# Patient Record
(demographics unavailable — no encounter records)

---

## 2021-05-25 NOTE — OR
DATE OF PROCEDURE:  05/17/2021

 

SURGEON:  Junior Caldwell MD

 

PREOPERATIVE DIAGNOSIS:  Epigastric pain.

 

POSTOPERATIVE DIAGNOSES:

1. Epigastric pain associated with a small hiatal hernia and gastroesophageal reflux

    disease with possible Holt's esophagus.

2. Mild gastritis involving body and antrum of the stomach.

 

OPERATIVE PROCEDURES:  Esophagogastroduodenoscopy with:

1. Biopsy of esophagogastric junction for histologic evaluation.

2. Biopsies of antrum for CLOtest.

 

ANESTHESIA:  IV sedation.

 

INDICATION FOR PROCEDURE:  This is a 36-year-old male presenting with some ongoing

epigastric discomfort along with some heartburn symptoms.  Plan is to proceed with an upper

GI endoscopy.  The patient has never been on antisecretory medication, was given a

prescription for omeprazole 20 mg b.i.d., but has not started that yet, which is probably a

good thing in terms of getting baseline diagnostic evaluation.  The plans is to proceed with

upper endoscopy with biopsy as indicated.  Potential risks including bleeding and

perforation were discussed, and the patient wishes to proceed.

 

DESCRIPTION OF PROCEDURE:  The patient was taken to the operating room and placed in a left

lateral decubitus position.  IV sedation was administered, after which the upper GI

endoscope was passed orally through the length of the esophagus into the stomach,

retroflexion revealed the fundus with the pyloric channel into the proximal duodenum.

 

Findings included normal hypopharynx, larynx, upper esophageal sphincter, esophageal body.

At the EG junction, the patient was noted to have a small hernia measuring 1 to 2 cm.  There

was some generalized edema and friability of the distal esophagus, also there was some

upward extension of the columnar mucosa and a single island of columnar mucosa suggestive of

possible Holt's esophagus.  No stricturing or plaquing were seen.

 

We did a __________ distal body and antrum without erosions or ulcers.  Pyloric channel or

duodenum and any other __________ portions were otherwise unremarkable.  Biopsies were

obtained from the antrum and sent for CLOtest for H pylori.  Multiple biopsies were then

obtained from the esophagogastric junction focusing on the areas of possible Holt's

esophagus.  No bleeding from the biopsy site was seen.  The procedure was then concluded.

 

At this point, the patient will be given Protonix 40 mg IV in the recovery room and then he

will be getting his omeprazole dosage started and we will have him follow up with Dr. Zamora

in the [facility] in 2 to 3 weeks.

 

 

 

 

Junior Caldwell MD

DD:  05/24/2021 06:56:58

DT:  05/25/2021 14:43:36

Job #:  [id number]